# Patient Record
Sex: MALE | Employment: FULL TIME | URBAN - METROPOLITAN AREA
[De-identification: names, ages, dates, MRNs, and addresses within clinical notes are randomized per-mention and may not be internally consistent; named-entity substitution may affect disease eponyms.]

---

## 2019-04-21 ENCOUNTER — APPOINTMENT (OUTPATIENT)
Dept: GENERAL RADIOLOGY | Age: 24
End: 2019-04-21
Attending: PHYSICIAN ASSISTANT
Payer: SELF-PAY

## 2019-04-21 ENCOUNTER — HOSPITAL ENCOUNTER (EMERGENCY)
Age: 24
Discharge: HOME | End: 2019-04-21
Attending: EMERGENCY MEDICINE | Admitting: EMERGENCY MEDICINE
Payer: SELF-PAY

## 2019-04-21 ENCOUNTER — APPOINTMENT (OUTPATIENT)
Dept: CT IMAGING | Age: 24
End: 2019-04-21
Attending: PHYSICIAN ASSISTANT
Payer: SELF-PAY

## 2019-04-21 VITALS
OXYGEN SATURATION: 99 % | SYSTOLIC BLOOD PRESSURE: 133 MMHG | WEIGHT: 165 LBS | TEMPERATURE: 98.7 F | DIASTOLIC BLOOD PRESSURE: 72 MMHG | RESPIRATION RATE: 16 BRPM | HEART RATE: 81 BPM | BODY MASS INDEX: 23.01 KG/M2

## 2019-04-21 DIAGNOSIS — S20.20XA CONTUSION OF THORACIC WALL, UNSPECIFIED AREA OF THORACIC WALL, INITIAL ENCOUNTER: Primary | ICD-10-CM

## 2019-04-21 PROCEDURE — 72070 X-RAY EXAM THORAC SPINE 2VWS: CPT

## 2019-04-21 PROCEDURE — 99283 EMERGENCY DEPT VISIT LOW MDM: CPT

## 2019-04-21 PROCEDURE — 74011250636 HC RX REV CODE- 250/636: Performed by: EMERGENCY MEDICINE

## 2019-04-21 PROCEDURE — 74011250637 HC RX REV CODE- 250/637: Performed by: PHYSICIAN ASSISTANT

## 2019-04-21 PROCEDURE — 72100 X-RAY EXAM L-S SPINE 2/3 VWS: CPT

## 2019-04-21 PROCEDURE — 96374 THER/PROPH/DIAG INJ IV PUSH: CPT

## 2019-04-21 PROCEDURE — 72125 CT NECK SPINE W/O DYE: CPT

## 2019-04-21 RX ORDER — NAPROXEN 500 MG/1
500 TABLET ORAL 2 TIMES DAILY WITH MEALS
Qty: 20 TAB | Refills: 0 | Status: SHIPPED | OUTPATIENT
Start: 2019-04-21 | End: 2020-07-06

## 2019-04-21 RX ORDER — CYCLOBENZAPRINE HCL 5 MG
10 TABLET ORAL
Qty: 9 TAB | Refills: 0 | Status: SHIPPED | OUTPATIENT
Start: 2019-04-21

## 2019-04-21 RX ORDER — MORPHINE SULFATE 2 MG/ML
2 INJECTION, SOLUTION INTRAMUSCULAR; INTRAVENOUS
Status: COMPLETED | OUTPATIENT
Start: 2019-04-21 | End: 2019-04-21

## 2019-04-21 RX ORDER — CYCLOBENZAPRINE HCL 10 MG
10 TABLET ORAL
Status: COMPLETED | OUTPATIENT
Start: 2019-04-21 | End: 2019-04-21

## 2019-04-21 RX ADMIN — MORPHINE SULFATE 2 MG: 2 INJECTION, SOLUTION INTRAMUSCULAR; INTRAVENOUS at 05:58

## 2019-04-21 RX ADMIN — CYCLOBENZAPRINE 10 MG: 10 TABLET, FILM COATED ORAL at 03:51
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
## 2019-04-21 NOTE — ED NOTES
I have reviewed discharge instructions with the patient. The patient verbalized understanding. Patient armband removed and shredded. Pt d/c'd to home awake, alert and in NAD. All questions answered. Pt driven home by his father who is present

## 2020-06-20 ENCOUNTER — APPOINTMENT (OUTPATIENT)
Dept: GENERAL RADIOLOGY | Age: 25
End: 2020-06-20
Attending: EMERGENCY MEDICINE
Payer: COMMERCIAL

## 2020-06-20 ENCOUNTER — HOSPITAL ENCOUNTER (EMERGENCY)
Age: 25
Discharge: HOME | End: 2020-06-20
Attending: EMERGENCY MEDICINE
Payer: COMMERCIAL

## 2020-06-20 VITALS
DIASTOLIC BLOOD PRESSURE: 70 MMHG | SYSTOLIC BLOOD PRESSURE: 126 MMHG | HEIGHT: 70 IN | TEMPERATURE: 98.3 F | HEART RATE: 62 BPM | RESPIRATION RATE: 16 BRPM | WEIGHT: 185 LBS | BODY MASS INDEX: 26.48 KG/M2 | OXYGEN SATURATION: 99 %

## 2020-06-20 DIAGNOSIS — M79.671 RIGHT FOOT PAIN: ICD-10-CM

## 2020-06-20 DIAGNOSIS — S99.921A RIGHT FOOT INJURY, INITIAL ENCOUNTER: Primary | ICD-10-CM

## 2020-06-20 PROCEDURE — 73610 X-RAY EXAM OF ANKLE: CPT

## 2020-06-20 PROCEDURE — 74011250637 HC RX REV CODE- 250/637: Performed by: PHYSICIAN ASSISTANT

## 2020-06-20 PROCEDURE — 73630 X-RAY EXAM OF FOOT: CPT

## 2020-06-20 PROCEDURE — 99283 EMERGENCY DEPT VISIT LOW MDM: CPT

## 2020-06-20 RX ORDER — HYDROCODONE BITARTRATE AND ACETAMINOPHEN 5; 325 MG/1; MG/1
1 TABLET ORAL
Status: COMPLETED | OUTPATIENT
Start: 2020-06-20 | End: 2020-06-20

## 2020-06-20 RX ORDER — HYDROCODONE BITARTRATE AND ACETAMINOPHEN 5; 325 MG/1; MG/1
1 TABLET ORAL
Qty: 12 TAB | Refills: 0 | Status: SHIPPED | OUTPATIENT
Start: 2020-06-20 | End: 2020-06-23

## 2020-06-20 RX ADMIN — HYDROCODONE BITARTRATE AND ACETAMINOPHEN 1 TABLET: 5; 325 TABLET ORAL at 10:19

## 2020-06-20 NOTE — ED PROVIDER NOTES
EMERGENCY DEPARTMENT HISTORY AND PHYSICAL EXAM    Date: 6/20/2020  Patient Name: Funmi Castellon    History of Presenting Illness     Chief Complaint   Patient presents with    Foot Pain    Ankle Pain    Toe Pain         History Provided By: Patient    Chief Complaint: Right foot injury  Duration: Yesterday  Timing: Acute  Location: Midfoot  Quality: Swollen  Severity: Moderate to severe  Modifying Factors: Worse when walking  Associated Symptoms: none       Additional History (Context): Funmi Castellon is a 31 y.o. male with a history of bipolar and hypertension who presents today for history as listed above. Patient reports he kicked the end of his trailer hitch yesterday. Has not tried thing for this at home. Denies any prior injuries or surgeries. Reports pain is worse when bearing weight. PCP: None    Current Facility-Administered Medications   Medication Dose Route Frequency Provider Last Rate Last Dose    HYDROcodone-acetaminophen (NORCO) 5-325 mg per tablet 1 Tab  1 Tab Oral NOW Ivana Cowden E, PA         Current Outpatient Medications   Medication Sig Dispense Refill    HYDROcodone-acetaminophen (Norco) 5-325 mg per tablet Take 1 Tab by mouth every six (6) hours as needed for Pain for up to 3 days. Max Daily Amount: 4 Tabs. 12 Tab 0    cyclobenzaprine (FLEXERIL) 5 mg tablet Take 2 Tabs by mouth three (3) times daily (with meals). 9 Tab 0    naproxen (NAPROSYN) 500 mg tablet Take 1 Tab by mouth two (2) times daily (with meals). 20 Tab 0    methocarbamol (ROBAXIN-750) 750 mg tablet Take 1 Tab by mouth four (4) times daily. 20 Tab 0    ibuprofen (MOTRIN) 600 mg tablet Take 1 Tab by mouth every eight (8) hours as needed for Pain.  20 Tab 0       Past History     Past Medical History:  Past Medical History:   Diagnosis Date    Bipolar 1 disorder ([facility] [address]     Hypertension        Past Surgical History:  Past Surgical History:   Procedure Laterality Date    HX SHOULDER ARTHROSCOPY Family History:  Family History   Problem Relation Age of Onset    Depression Father     Substance Abuse Father     Hypertension Father     Stroke Father     Asthma Sister     Asthma Brother        Social History:  Social History     Tobacco Use    Smoking status: Current Some Day Smoker    Smokeless tobacco: Never Used   Substance Use Topics    Alcohol use: No    Drug use: Yes     Types: Marijuana       Allergies:  No Known Allergies      Review of Systems   Review of Systems   Constitutional: Negative for chills and fever. HENT: Negative for congestion, rhinorrhea and sore throat. Respiratory: Negative for cough and shortness of breath. Cardiovascular: Negative for chest pain. Gastrointestinal: Negative for abdominal pain, blood in stool, constipation, diarrhea, nausea and vomiting. Genitourinary: Negative for dysuria, frequency and hematuria. Musculoskeletal: Positive for arthralgias. Negative for back pain and myalgias. Skin: Negative for rash and wound. Neurological: Negative for dizziness and headaches. All other systems reviewed and are negative. All Other Systems Negative  Physical Exam     Vitals:    06/20/20 0724   BP: 126/70   Pulse: 62   Resp: 16   Temp: 98.3 °F (36.8 °C)   SpO2: 99%   Weight: 83.9 kg (185 lb)   Height: 5' 10\" (1.778 m)     Physical Exam  Vitals signs and nursing note reviewed. Constitutional:       General: He is not in acute distress. Appearance: He is well-developed. He is not diaphoretic. HENT:      Head: Normocephalic and atraumatic. Eyes:      Conjunctiva/sclera: Conjunctivae normal.   Neck:      Musculoskeletal: Normal range of motion and neck supple. Cardiovascular:      Rate and Rhythm: Normal rate and regular rhythm. Heart sounds: Normal heart sounds. Pulmonary:      Effort: Pulmonary effort is normal. No respiratory distress. Breath sounds: Normal breath sounds. Chest:      Chest wall: No tenderness.    Abdominal: General: Bowel sounds are normal. There is no distension. Palpations: Abdomen is soft. Tenderness: There is no abdominal tenderness. There is no guarding or rebound. Musculoskeletal: Normal range of motion. General: No deformity. Feet:    Skin:     General: Skin is warm and dry. Neurological:      Mental Status: He is alert and oriented to person, place, and time. Diagnostic Study Results     Labs -   No results found for this or any previous visit (from the past 12 hour(s)). Radiologic Studies -   XR FOOT RT MIN 3 V   Final Result   IMPRESSION:      Very mild anterior soft tissue swelling as described above. Likely no acute fracture but with findings involving the distal navicular. Please correlate for point tenderness            XR ANKLE RT MIN 3 V   Final Result   IMPRESSION:      Very mild anterior soft tissue swelling as described above. Likely no acute fracture but with findings involving the distal navicular. Please correlate for point tenderness        CT Results  (Last 48 hours)    None        CXR Results  (Last 48 hours)    None            Medical Decision Making   I am the first provider for this patient. I reviewed the vital signs, available nursing notes, past medical history, past surgical history, family history and social history. Vital Signs-Reviewed the patient's vital signs. Records Reviewed: Nursing Notes and Old Medical Records     Procedures: None   Procedures    Provider Notes (Medical Decision Making):     Differential: fracture, dislocation, abrasion, sprain, contusion, laceration      Plan: Will order xrays and pain medication    10:09 AM  Have discussed mild concern for fracture of the right foot. Will place in postop shoe and crutches. Will discharge home with pain medicine. Have stressed the importance of Ortho follow-up. Have discussed rice care for home. Will discharge home.       MED RECONCILIATION:  Current Facility-Administered Medications   Medication Dose Route Frequency    HYDROcodone-acetaminophen (NORCO) 5-325 mg per tablet 1 Tab  1 Tab Oral NOW     Current Outpatient Medications   Medication Sig    HYDROcodone-acetaminophen (Norco) 5-325 mg per tablet Take 1 Tab by mouth every six (6) hours as needed for Pain for up to 3 days. Max Daily Amount: 4 Tabs.  cyclobenzaprine (FLEXERIL) 5 mg tablet Take 2 Tabs by mouth three (3) times daily (with meals).  naproxen (NAPROSYN) 500 mg tablet Take 1 Tab by mouth two (2) times daily (with meals).  methocarbamol (ROBAXIN-750) 750 mg tablet Take 1 Tab by mouth four (4) times daily.  ibuprofen (MOTRIN) 600 mg tablet Take 1 Tab by mouth every eight (8) hours as needed for Pain. Disposition:  Home     DISCHARGE NOTE:   Pt has been reexamined. Patient has no new complaints, changes, or physical findings. Care plan outlined and precautions discussed. Results of workup were reviewed with the patient. All medications were reviewed with the patient. All of pt's questions and concerns were addressed. Patient was instructed and agrees to follow up with ortho as well as to return to the ED upon further deterioration. Patient is ready to go home. Follow-up Information     Follow up With Specialties Details Why Contact Info    [facility] EMERGENCY DEPT Emergency Medicine  As needed [phone] [facility] - Domitila Patel Orthopedic Surgery Schedule an appointment as soon as possible for a visit  [address] [facility], [address] [facility]  [phone]          Current Discharge Medication List      START taking these medications    Details   HYDROcodone-acetaminophen (Norco) 5-325 mg per tablet Take 1 Tab by mouth every six (6) hours as needed for Pain for up to 3 days. Max Daily Amount: 4 Tabs.   Qty: 12 Tab, Refills: 0    Associated Diagnoses: Right foot injury, initial encounter; Right foot pain                 Diagnosis     Clinical Impression:   1. Right foot injury, initial encounter    2. Right foot pain          \"Please note that this dictation was completed with [employer], the computer voice recognition software. Quite often unanticipated grammatical, syntax, homophones, and other interpretive errors are inadvertently transcribed by the computer software. Please disregard these errors. Please excuse any errors that have escaped final proofreading. \"

## 2020-06-20 NOTE — DISCHARGE INSTRUCTIONS
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
## 2020-06-20 NOTE — LETTER
[address] [place] [address] Dr Smitha Leyva Anderson EMERGENCY DEPT 
[address] [facility] [address] 
[phone] Work/School Note Date: 6/20/2020 To Whom It May concern: 
 
Akash Collins was seen and treated today in the emergency room by the following provider(s): 
Attending Provider: Russell Brar MD 
Physician Assistant: GALINA Hernández. Akash Collins may return to work on 6/25/20 Sincerely, GALINA Velasco

## 2020-06-22 ENCOUNTER — OFFICE VISIT (OUTPATIENT)
Dept: ORTHOPEDIC SURGERY | Age: 25
End: 2020-06-22

## 2020-06-22 VITALS
BODY MASS INDEX: 26.48 KG/M2 | TEMPERATURE: 98 F | DIASTOLIC BLOOD PRESSURE: 72 MMHG | RESPIRATION RATE: 15 BRPM | OXYGEN SATURATION: 100 % | HEART RATE: 79 BPM | HEIGHT: 70 IN | WEIGHT: 185 LBS | SYSTOLIC BLOOD PRESSURE: 123 MMHG

## 2020-06-22 DIAGNOSIS — S92.254A CLOSED NONDISPLACED FRACTURE OF NAVICULAR BONE OF RIGHT FOOT, INITIAL ENCOUNTER: ICD-10-CM

## 2020-06-22 DIAGNOSIS — S90.31XA CONTUSION OF RIGHT FOOT, INITIAL ENCOUNTER: Primary | ICD-10-CM

## 2020-06-22 DIAGNOSIS — S84.90XA NEUROPRAXIA OF LEG: ICD-10-CM

## 2020-06-22 RX ORDER — TRAMADOL HYDROCHLORIDE AND ACETAMINOPHEN 37.5; 325 MG/1; MG/1
1-2 TABLET ORAL 2 TIMES DAILY
Qty: 20 TAB | Refills: 0 | Status: SHIPPED | OUTPATIENT
Start: 2020-06-22 | End: 2020-06-27

## 2020-06-22 NOTE — LETTER
NOTIFICATION RETURN TO WORK / SCHOOL 
 
6/22/2020 2:05 PM 
 
Mr. Sea Kc 
[address] [facility] [place] [address] To Whom It May Concern: 
 
Sea Kc is currently under the care of [address] [place] [address] Antoni Soares. Please allow Mr. Shirlene Henderson to remain out of work for the next 3 weeks. He was evaluated for contusion of the right foot and will be reassessed in two weeks. If there are questions or concerns please have the patient contact our office.  
 
 
 
Sincerely, 
 
 
Yao Cooley MD
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
## 2020-07-06 ENCOUNTER — OFFICE VISIT (OUTPATIENT)
Dept: ORTHOPEDIC SURGERY | Age: 25
End: 2020-07-06

## 2020-07-06 VITALS
SYSTOLIC BLOOD PRESSURE: 128 MMHG | HEIGHT: 70 IN | HEART RATE: 71 BPM | RESPIRATION RATE: 16 BRPM | DIASTOLIC BLOOD PRESSURE: 59 MMHG | TEMPERATURE: 98.6 F | WEIGHT: 164.8 LBS | OXYGEN SATURATION: 97 % | BODY MASS INDEX: 23.59 KG/M2

## 2020-07-06 DIAGNOSIS — S90.31XA CONTUSION OF RIGHT FOOT, INITIAL ENCOUNTER: ICD-10-CM

## 2020-07-06 DIAGNOSIS — M79.671 RIGHT FOOT PAIN: ICD-10-CM

## 2020-07-06 DIAGNOSIS — S92.254A CLOSED NONDISPLACED FRACTURE OF NAVICULAR BONE OF RIGHT FOOT, INITIAL ENCOUNTER: Primary | ICD-10-CM

## 2020-07-06 RX ORDER — DICLOFENAC SODIUM 75 MG/1
75 TABLET, DELAYED RELEASE ORAL 2 TIMES DAILY WITH MEALS
Qty: 60 TAB | Refills: 0 | Status: SHIPPED | OUTPATIENT
Start: 2020-07-06

## 2020-07-06 NOTE — LETTER
NOTIFICATION RETURN TO WORK / SCHOOL 
 
7/6/2020 9:15 AM 
 
Mr. Nela Lebron 
[address] [place] [id number] To Whom It May Concern: 
 
Nela Lebron is currently under the care of [address] [place] [address] Antoni Soares. Please allow Mr. Olman Olmstead to remain out of work for another 2 weeks. He is still being evaluated for right foot contusion. If there are questions or concerns please have the patient contact our office.  
 
 
 
Sincerely, 
 
 
Adrienne Coughlin MD

## 2020-07-06 NOTE — PROGRESS NOTES
AMBULATORY PROGRESS NOTE      Patient: Sara Hernandez             MRN: [id number]     SSN: [id number] Body mass index is 23.65 kg/m². YOB: 1995     AGE: 31 y.o. EX: male    PCP: None       IMPRESSION //  DIAGNOSIS AND TREATMENT PLAN      DIAGNOSES  1. Closed nondisplaced fracture of navicular bone of right foot, initial encounter    2. Right foot pain    3. Contusion of right foot, initial encounter        Orders Placed This Encounter    [[id number]] Foot Min 3V     Order Specific Question:   Weight bearing? Answer:   No    diclofenac EC (VOLTAREN) 75 mg EC tablet     Sig: Take 1 Tab by mouth two (2) times daily (with meals). Dispense:  60 Tab     Refill:  0       The neuropraxia, we decide to wait it out with time this shall improve but may take several months. Additional plans are listed as below. History of some discomfort the dorsal part of his right foot, overlying tarsal navicular. His x-rays today, show 3 views, right foot, AP, lateral, and oblique films: Nonweightbearing: Avulsion small avulsion fracture of the dorsal part of the tarsal navicular. Otherwise no subluxation, and/or dislocation is noted. Overall alignment, is anatomic alignment to the foot, midfoot, hindfoot. Right foot contusion, dorsal tarsal navicular avulsion fracture, DPN nerve neuropraxia, improving    PLAN:    1. Voltaren 75 m PO BID; 60 tablets, 0 refills. 2. Work Note: Please allow Mr. Laura Mcclelland to remain out of work for another 2 weeks. He is still being evaluated for right foot contusion. RTO- 2 weeks      HPI //  OBJECTIVE EXAMINATION     Sara Hernandez IS A 31 y.o. male who presents to my outpatient office for RTO evaluation of right foot contusion and  an avulsion fracture of the tarsal navicular.   At the last OV, I instructed the pt to continue to wear hard sole shoes and NWB as much as possible, provided a work note to allow Mr. Laura Mcclelland to remain out of work for the next three weeks, and prescribed Tramadol 1-2 tabs BID 20 tablets. Date of Injury: 2 weeks aggo    Since the last OV, Srinivas Gardiner reports that he is still experiencing persistent pain, but not all the time. He feels pain when he puts pressure or walks for prolonged periods. The pt reports not being able to walk on uneven terrain, citing that his ankle is not strong. He reports that walking on uneven terrain causes him to fall. He admits that he finds himself limping occasionally. The pt admits to having stomach acid problems. 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 ANKLE/FOOT right    Psychiatry: Alert, oriented x 3; speech normal in context and clarity, memory intact grossly, no involuntary movements - tremors, no dementia  Gait: uses assistive device  crutches  Tenderness: moderate to anterior ankle and midfoot, anterior forefoot    Moderate to extensor brevis  Cutaneous: mild swelling to midfoot   Joint Motion: WNL  Joint / Tendon Stability: No Ankle or Subtalar instability or joint laxity. No peroneal sublux ability or dislocation  Alignment: neutral Hindfoot  Neuro Motor/Sensory: NL//positive Tinel sign to the deep peroneal nerve on the dorsal part of the foot. There is numbness in the first webspace, as well as the dorsal part of his foot. Numbness to the dorsal forefoot. Vascular: NL foot/ankle pulses. Lymphatics: No extremity lymphedema, No calf swelling, no tenderness to calf muscles. CHART REVIEW     There is no problem list on file for this patient. Srinivas Gardiner has been experiencing pain and discomfort confirmed as outlined in the pain assessment outlined below.       Pain Assessment  7/6/2020   Location of Pain Foot   Location Modifiers Right   Severity of Pain 6   Quality of Pain Aching   Quality of Pain Comment -   Duration of Pain A few hours   Frequency of Pain Several times daily Aggravating Factors Walking;Standing   Aggravating Factors Comment -   Relieving Factors Rest   Result of Injury No   Type of Injury -   Type of Injury Comment -        Celestine Crane  has a past medical history of Bipolar 1 disorder ([facility] and Hypertension. Patients is employed at:         Past Medical History:   Diagnosis Date    Bipolar 1 disorder ([facility]     Hypertension      Current Outpatient Medications   Medication Sig    diclofenac EC (VOLTAREN) 75 mg EC tablet Take 1 Tab by mouth two (2) times daily (with meals).  cyclobenzaprine (FLEXERIL) 5 mg tablet Take 2 Tabs by mouth three (3) times daily (with meals).  methocarbamol (ROBAXIN-750) 750 mg tablet Take 1 Tab by mouth four (4) times daily.  ibuprofen (MOTRIN) 600 mg tablet Take 1 Tab by mouth every eight (8) hours as needed for Pain. No current facility-administered medications for this visit. THE  FOR Jeffrey Albert MD 7/6/2020 .    No Known Allergies  Past Surgical History:   Procedure Laterality Date    HX SHOULDER ARTHROSCOPY       Social History     Occupational History    Not on file   Tobacco Use    Smoking status: Current Some Day Smoker    Smokeless tobacco: Never Used   Substance and Sexual Activity    Alcohol use: No    Drug use: Yes     Types: Marijuana    Sexual activity: Not on file     Family History   Problem Relation Age of Onset    Depression Father     Substance Abuse Father     Hypertension Father     Stroke Father     Asthma Sister     Asthma Brother         DIAGNOSTIC IMAGING  LAB DATA      No results found for: HBA1C, HGBE8, VSA2WWIW, DIS0KMOS //   Lab Results   Component Value Date/Time    Glucose 94 02/04/2019 01:05 PM        No results found for: TWY9HSLH, TKT3BFVJ      No results found for: VITD3, XQVID2, XQVID3, XQVID, VD3RIA, [id number]      REVIEW OF SYSTEMS : 7/6/2020  ALL BELOW ARE Negative except : SEE HPI     Constitutional: Negative for fever, chills and weight loss. Neg Weight Loss  Cardiovascular: Negative for chest pain, claudication and leg swelling. SOB, WALLS   Gastrointestinal/Urological: Negative for pain, N/V/D/C, Blood in stool or urine,dysuria, Hematuria, Incontinence  Musculoskeletal: see HPI. Neurological: Negative for dizziness and weakness, headaches,Visual Changes or             Confusion, or Seizures,   Psychiatric/Behavioral: Negative for depression, memory loss and substance abuse. Extremities:  Negative for hair changes, rash or skin lesion changes. Hematologic: Negative for Bleeding problems, bruising, pallor or swollen lymph nodes. Peripheral Vascular: No calf pain, vascular vein tenderness to calf pain// No calf throbbing, posterior knee throbbing pain     DIAGNOSTIC IMAGING        XR Results (most recent):  Results from Hospital Encounter encounter on 06/20/20   XR ANKLE RT MIN 3 V    Narrative EXAM: ANKLE THREE VIEWS  RIGHT     CLINICAL HISTORY/INDICATION:  Right ankle foot and toe pain status post injury  one day ago    COMPARISON: None. TECHNIQUE: 3 views    FINDINGS:      There is minimal soft tissue swelling along the anterior ankle and hindfoot. There there is some lucency extending incompletely along the anterior distal  navicular at the site of the overlying soft tissue swelling. The borders are  however ill-defined. The joint spaces are maintained. Mineralization is normal.   There is no radiopaque foreign body. Impression IMPRESSION:    Very mild anterior soft tissue swelling as described above. Likely no acute fracture but with findings involving the distal navicular.   Please correlate for point tenderness        Result Information     Status: Final result (Exam End: 6/20/2020 07:39) Provider Status: Open   Study Result     EXAM: FOOT THREE VIEWS RIGHT      CLINICAL HISTORY/INDICATION: Right ankle, foot, and to pain status post injury  one day ago     COMPARISON: None.     TECHNIQUE: Three views obtained.     FINDINGS:       There is minimal soft tissue swelling along the anterior ankle and talus,  navicular, and cuneiforms. An ill-defined linear region of lucency appears to  interrupt the cortex of the distal anterior navicular but does not extend to the  cortical surface at the navicular cuneiform joint. The borders are ill defined. The joint spaces are maintained. Mineralization is normal.  There is no  radiopaque foreign body.     IMPRESSION  IMPRESSION:     Very mild anterior soft tissue swelling as described above. Likely no acute fracture but with findings involving the distal navicular. Please correlate for point tenderness                  Please see above section of this report. I have personally reviewed the results of the above study. The interpretation of this study is my professional opinion.       Ena Nicholson MD  7/6/2020  12:38 PM

## 2020-07-20 ENCOUNTER — OFFICE VISIT (OUTPATIENT)
Dept: ORTHOPEDIC SURGERY | Age: 25
End: 2020-07-20

## 2020-07-20 VITALS
SYSTOLIC BLOOD PRESSURE: 116 MMHG | WEIGHT: 159.2 LBS | BODY MASS INDEX: 22.29 KG/M2 | TEMPERATURE: 97.3 F | HEART RATE: 93 BPM | DIASTOLIC BLOOD PRESSURE: 72 MMHG | OXYGEN SATURATION: 98 % | HEIGHT: 71 IN | RESPIRATION RATE: 18 BRPM

## 2020-07-20 DIAGNOSIS — S92.254D CLOSED NONDISPLACED FRACTURE OF NAVICULAR BONE OF RIGHT FOOT WITH ROUTINE HEALING, SUBSEQUENT ENCOUNTER: Primary | ICD-10-CM

## 2020-07-20 DIAGNOSIS — M79.671 RIGHT FOOT PAIN: ICD-10-CM

## 2020-07-20 NOTE — PROGRESS NOTES
AMBULATORY PROGRESS NOTE      Patient: Leena Larsen             MRN: [id number]     SSN: [id number] Body mass index is 22.2 kg/m². YOB: 1995     AGE: 31 y.o. EX: male    PCP: None       IMPRESSION //  DIAGNOSIS AND TREATMENT PLAN      DIAGNOSES  1. Right foot pain        Orders Placed This Encounter    [42874] Foot Min 3V     Order Specific Question:   Weight bearing? Answer:   No        Right foot contusion, dorsal tarsal navicular avulsion fracture, DPN nerve neuropraxia, improving    PLAN:    1. Work Note: Please allow Mr. Evangelina Lee to return to work tomorrow for [employer]. RTO-   PRN as he is doing better. HPI //  [address] A 31 y.o. male who presents to my outpatient office for RTO evaluation of right foot contusion and  an avulsion fracture of the tarsal navicular. At the last OV, I prescribed Voltaren 75 mg and provided a work note to allow Mr. Evangelina Lee to remain out of work for another 2 weeks. Date of Injury: end of June approx    Since the last OV, Leena Larsen is no longer having immediate start-up pain. He is still experiencing some occasional soreness. He was not able to obtain the Voltaren, because he was not     The pt admits to having stomach acid problems. 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 ANKLE/FOOT right    Psychiatry: Alert, oriented x 3; speech normal in context and clarity, memory intact grossly, no involuntary movements - tremors, no dementia  Gait: uses assistive device  crutches  Tenderness: mild to anterior ankle and midfoot, anterior forefoot    slight to extensor brevis  Cutaneous: slightly reddened in color. Mild midfoot swelling   Joint Motion: WNL  Joint / Tendon Stability: No Ankle or Subtalar instability or joint laxity.                        No peroneal sublux ability or dislocation  Alignment: neutral Hindfoot  Neuro Motor/Sensory: NL//positive Tinel sign to the deep peroneal nerve on the dorsal part of the foot. There is numbness in the first webspace, as well as the dorsal part of his foot. Numbness to the dorsal forefoot. Vascular: NL foot/ankle pulses. Lymphatics: No extremity lymphedema, No calf swelling, no tenderness to calf muscles. CHART REVIEW     There is no problem list on file for this patient. Ira Mathews has been experiencing pain and discomfort confirmed as outlined in the pain assessment outlined below. Pain Assessment  7/6/2020   Location of Pain Foot   Location Modifiers Right   Severity of Pain 6   Quality of Pain Aching   Quality of Pain Comment -   Duration of Pain A few hours   Frequency of Pain Several times daily   Aggravating Factors Walking;Standing   Aggravating Factors Comment -   Relieving Factors Rest   Result of Injury No   Type of Injury -   Type of Injury Comment -        Ira Mathews  has a past medical history of Bipolar 1 disorder ([facility]) and Hypertension. Patients is employed at:         Past Medical History:   Diagnosis Date    Bipolar 1 disorder ([address]     Hypertension      Current Outpatient Medications   Medication Sig    diclofenac EC (VOLTAREN) 75 mg EC tablet Take 1 Tab by mouth two (2) times daily (with meals).  cyclobenzaprine (FLEXERIL) 5 mg tablet Take 2 Tabs by mouth three (3) times daily (with meals).  methocarbamol (ROBAXIN-750) 750 mg tablet Take 1 Tab by mouth four (4) times daily.  ibuprofen (MOTRIN) 600 mg tablet Take 1 Tab by mouth every eight (8) hours as needed for Pain. No current facility-administered medications for this visit. THE  FOR [facility]  WAS REVIEWED BY Perez Leach 7/20/2020 .    No Known Allergies  Past Surgical History:   Procedure Laterality Date    HX SHOULDER ARTHROSCOPY       Social History     Occupational History    Not on file   Tobacco Use    Smoking status: Current Some Day Smoker    Smokeless tobacco: Never Used   Substance and Sexual Activity    Alcohol use: No    Drug use: Yes     Types: Marijuana    Sexual activity: Not on file     Family History   Problem Relation Age of Onset    Depression Father     Substance Abuse Father     Hypertension Father     Stroke Father     Asthma Sister     Asthma Brother         DIAGNOSTIC IMAGING  LAB DATA      No results found for: HBA1C, HGBE8, VCN0ZGRA, PIC1GNRW //   Lab Results   Component Value Date/Time    Glucose 94 02/04/2019 01:05 PM        No results found for: YNW9FNUR, LPF5BNVH      No results found for: VITD3, XQVID2, XQVID3, XQVID, VD3RIA, UHNV60CXQCV      REVIEW OF SYSTEMS : 7/20/2020  ALL BELOW ARE Negative except : SEE HPI     Constitutional: Negative for fever, chills and weight loss. Neg Weight Loss  Cardiovascular: Negative for chest pain, claudication and leg swelling. SOB, WALLS   Gastrointestinal/Urological: Negative for pain, N/V/D/C, Blood in stool or urine,dysuria, Hematuria, Incontinence  Musculoskeletal: see HPI. Neurological: Negative for dizziness and weakness, headaches,Visual Changes or             Confusion, or Seizures,   Psychiatric/Behavioral: Negative for depression, memory loss and substance abuse. Extremities:  Negative for hair changes, rash or skin lesion changes. Hematologic: Negative for Bleeding problems, bruising, pallor or swollen lymph nodes. Peripheral Vascular: No calf pain, vascular vein tenderness to calf pain// No calf throbbing, posterior knee throbbing pain     DIAGNOSTIC IMAGING        XR Results (most recent):  Results from Hospital Encounter encounter on 06/20/20   XR ANKLE RT MIN 3 V    Narrative EXAM: ANKLE THREE VIEWS  RIGHT     CLINICAL HISTORY/INDICATION:  Right ankle foot and toe pain status post injury  one day ago    COMPARISON: None. TECHNIQUE: 3 views    FINDINGS:      There is minimal soft tissue swelling along the anterior ankle and hindfoot.   There there is some lucency extending incompletely along the anterior distal  navicular at the site of the overlying soft tissue swelling. The borders are  however ill-defined. The joint spaces are maintained. Mineralization is normal.   There is no radiopaque foreign body. Impression IMPRESSION:    Very mild anterior soft tissue swelling as described above. Likely no acute fracture but with findings involving the distal navicular. Please correlate for point tenderness        Result Information     Status: Final result (Exam End: 6/20/2020 07:39) Provider Status: Open   Study Result     EXAM: FOOT THREE VIEWS RIGHT      CLINICAL HISTORY/INDICATION: Right ankle, foot, and to pain status post injury  one day ago     COMPARISON: None.     TECHNIQUE: Three views obtained.     FINDINGS:       There is minimal soft tissue swelling along the anterior ankle and talus,  navicular, and cuneiforms. An ill-defined linear region of lucency appears to  interrupt the cortex of the distal anterior navicular but does not extend to the  cortical surface at the navicular cuneiform joint. The borders are ill defined. The joint spaces are maintained. Mineralization is normal.  There is no  radiopaque foreign body.     IMPRESSION  IMPRESSION:     Very mild anterior soft tissue swelling as described above. Likely no acute fracture but with findings involving the distal navicular. Please correlate for point tenderness           X RAYS/IMAGES DONE AT [facility] VIEW OFFICE 7/20/2020     FOOT X RAYS 3 VIEWS Right   7/20/2020    NON WEIGHT BEARING    X RAYS AT [address] [place] [address]  7/20/2020      Bones: No fractures or dislocations. No focal osteolytic or osteoblastic process     Bone Spurs: No significant bone spurs //I can barely see the fracture overlying the tarsal navicular. Still avulsion type fracture.   Foot Alignment: WNL  Joint Condition: No Significant OA  Soft Tissues: Normal, No radiopaque foreign body and No abnormal calcific densities to soft tissues   No ankle joint effusion in lateral projection. Mineralization: Suggests  no Osteopenia    I have personally reviewed the results of the above study and the interpretation of this study is my professional opinion        Please see above section of this report. I have personally reviewed the results of the above study. The interpretation of this study is my professional opinion.       Sanjuana Turner  7/20/2020  12:38 PM

## 2020-07-20 NOTE — LETTER
NOTIFICATION RETURN TO WORK / SCHOOL 
 
7/20/2020 9:59 AM 
 
Mr. Maribell Hubbard 
[address] [address] To Whom It May Concern: 
 
Maribell Hubbard is currently under the care of [address] [place], [place] [address]jesus [facility]. Please allow Mr. Aniket Tran to return to work tomorrow for [employer]. If there are questions or concerns please have the patient contact our office.  
 
 
 
Sincerely, 
 
 
Sima Gautam MD

## 2022-09-22 ENCOUNTER — NEW PATIENT (OUTPATIENT)
Dept: RURAL CLINIC 2 | Facility: CLINIC | Age: 27
End: 2022-09-22

## 2022-09-22 DIAGNOSIS — H53.59: ICD-10-CM

## 2022-09-22 DIAGNOSIS — H52.223: ICD-10-CM

## 2022-09-22 DIAGNOSIS — H52.12: ICD-10-CM

## 2022-09-22 PROCEDURE — 92015 DETERMINE REFRACTIVE STATE: CPT

## 2022-09-22 PROCEDURE — 99203 OFFICE O/P NEW LOW 30 MIN: CPT

## 2022-09-22 ASSESSMENT — TONOMETRY
OS_IOP_MMHG: 17
OD_IOP_MMHG: 19

## 2022-09-22 ASSESSMENT — VISUAL ACUITY
OD_SC: 20/20
OS_SC: 20/20

## 2023-07-19 ENCOUNTER — OFFICE VISIT (OUTPATIENT)
Age: 28
End: 2023-07-19
Payer: COMMERCIAL

## 2023-07-19 VITALS
HEART RATE: 71 BPM | TEMPERATURE: 97.9 F | HEIGHT: 69 IN | BODY MASS INDEX: 26.36 KG/M2 | RESPIRATION RATE: 16 BRPM | OXYGEN SATURATION: 98 % | DIASTOLIC BLOOD PRESSURE: 80 MMHG | WEIGHT: 178 LBS | SYSTOLIC BLOOD PRESSURE: 122 MMHG

## 2023-07-19 DIAGNOSIS — M54.12 CERVICAL NEURITIS: ICD-10-CM

## 2023-07-19 DIAGNOSIS — M54.2 CERVICAL PAIN (NECK): ICD-10-CM

## 2023-07-19 DIAGNOSIS — S39.012A STRAIN OF LUMBAR REGION, INITIAL ENCOUNTER: ICD-10-CM

## 2023-07-19 DIAGNOSIS — M50.30 DDD (DEGENERATIVE DISC DISEASE), CERVICAL: ICD-10-CM

## 2023-07-19 DIAGNOSIS — M54.16 LUMBAR NEURITIS: Primary | ICD-10-CM

## 2023-07-19 DIAGNOSIS — S16.1XXA STRAIN OF NECK MUSCLE, INITIAL ENCOUNTER: ICD-10-CM

## 2023-07-19 DIAGNOSIS — M54.50 LUMBAR PAIN: ICD-10-CM

## 2023-07-19 PROCEDURE — 99204 OFFICE O/P NEW MOD 45 MIN: CPT | Performed by: PHYSICAL MEDICINE & REHABILITATION

## 2023-07-19 RX ORDER — METHOCARBAMOL 500 MG/1
500 TABLET, FILM COATED ORAL 4 TIMES DAILY
COMMUNITY
Start: 2023-07-18

## 2023-07-19 RX ORDER — MELOXICAM 7.5 MG/1
7.5 TABLET ORAL DAILY
COMMUNITY
Start: 2023-07-18

## 2023-07-19 RX ORDER — METHYLPREDNISOLONE 4 MG/1
TABLET ORAL
Qty: 1 KIT | Refills: 0 | Status: SHIPPED | OUTPATIENT
Start: 2023-07-19
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
## 2023-07-26 ENCOUNTER — TELEPHONE (OUTPATIENT)
Age: 28
End: 2023-07-26

## 2023-07-26 NOTE — TELEPHONE ENCOUNTER
Attempted to contact pt to inform that I have spoken to [facility] and they have received his referral .He did not answer. LVM. [facility] at [phone] for scheduling.

## 2023-07-26 NOTE — TELEPHONE ENCOUNTER
Patient came into office stating he has not received a call from PT to get scheduled. Please call [phone].

## 2023-08-15 ENCOUNTER — TELEPHONE (OUTPATIENT)
Age: 28
End: 2023-08-15

## 2023-08-15 NOTE — TELEPHONE ENCOUNTER
Patient called upset due to he had a VV appt with JOSEFA Maldonado to have the Disability from filled out today. Patient said that he has been calling several times to make sure his appt was okay for today , because he needs the form filled out, so that he can return it to work as soon as possible. Patient said that he even got a call today and was told the everything was okay, and then they ended up cancelling his appt ,due to he lives in [place]. Patient said that the only reason he seen our doctors here is because their are no specialist in [place]. Patient said that we have had his forms for 1 1/2 months. That he is not being paid, and he needs to pay his electric bill. That today was the last day for him to pay the bill or they will cut off his electricity. Patient is requesting a call back at  [facility]. [phone].

## 2023-08-15 NOTE — TELEPHONE ENCOUNTER
Bhumi,     I cannot change the law regarding these VV. The Covid emergency is over so you have to be licensed in the state the pt is being seen at. This was a [facility] disability form that Dr. Coral Salmeron would have to sign.

## 2023-08-18 NOTE — TELEPHONE ENCOUNTER
Called patient [phone] and verified his name and date if birth. I informed him that I was following up on his concerns in regards to the virtual visit. Patient stated yes he had called 3 days before his appointment and he was told that he could have the appointment. Then he called 90 minutes before his appointment and then he stated he was told that he could still have the appointment and then 20 minutes before his appointment he received a call and was told that he could not have the appointment due to where he lives. He stated that was some bull crap because we have had the form for a while and he does not know why we cannot just sign the form because he needs it. He stated he feels like he is getting the run around. 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 He stated things are getting ready to be turned off because he does not have any money to pay because we do not want to sign this paper. He stated he only came to [facility] because we were the only ones that took his insurance. I explained to him due to the [facility] restrictions being lifted that we can no longer do the VV outside of the [facility]. I informed him that if he wanted too. He could have his PCP fill the paperwork out. If he feels that. He stated so why can't the doctor just fill the paper out and sign. I informed him that we generally have our NP fill the forms out through a visit and then the doctor signs it. He inquired if I could ask the doctor to fill it out and sign. So, he could get pain. I informed him that the doctor was out of the office and will be in the office on Monday and I will send the message to him and see what his response is. He stated if he can;t do it then he may have to come to office and see for himself. I informed him that I will send the message to the provider and once we receive a response. Someone will call him.  He stated

## 2023-08-21 NOTE — TELEPHONE ENCOUNTER
I discussed this with Dr Emely Aguayo this morning and he gave me permission to fill out the form. It was only asking for his out of work dates. He signed it and it has been scanned into the patients chart. I tried calling the patient to let him know that he can pick it up, and his VM is not set up.

## 2023-08-30 ENCOUNTER — OFFICE VISIT (OUTPATIENT)
Age: 28
End: 2023-08-30
Payer: COMMERCIAL

## 2023-08-30 VITALS
RESPIRATION RATE: 17 BRPM | WEIGHT: 185 LBS | HEART RATE: 72 BPM | OXYGEN SATURATION: 100 % | TEMPERATURE: 97.1 F | BODY MASS INDEX: 27.32 KG/M2

## 2023-08-30 DIAGNOSIS — M50.30 DDD (DEGENERATIVE DISC DISEASE), CERVICAL: ICD-10-CM

## 2023-08-30 DIAGNOSIS — M54.12 CERVICAL NEURITIS: ICD-10-CM

## 2023-08-30 DIAGNOSIS — M54.16 LUMBAR NEURITIS: Primary | ICD-10-CM

## 2023-08-30 DIAGNOSIS — M54.50 LUMBAR PAIN: ICD-10-CM

## 2023-08-30 DIAGNOSIS — M54.2 CERVICAL PAIN (NECK): ICD-10-CM

## 2023-08-30 PROCEDURE — 99213 OFFICE O/P EST LOW 20 MIN: CPT | Performed by: PHYSICAL MEDICINE & REHABILITATION
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
## 2023-09-07 ENCOUNTER — TELEPHONE (OUTPATIENT)
Age: 28
End: 2023-09-07

## 2023-09-07 NOTE — TELEPHONE ENCOUNTER
LVM for the patient to take OTC Motrin or Tylenol Arthritis until his next Dr. Adela Dubin appointment.

## 2023-09-11 ENCOUNTER — HOSPITAL ENCOUNTER (OUTPATIENT)
Facility: HOSPITAL | Age: 28
Discharge: HOME | End: 2023-09-14
Attending: PHYSICAL MEDICINE & REHABILITATION
Payer: COMMERCIAL

## 2023-09-11 DIAGNOSIS — M54.50 LUMBAR PAIN: ICD-10-CM

## 2023-09-11 DIAGNOSIS — M50.30 DDD (DEGENERATIVE DISC DISEASE), CERVICAL: ICD-10-CM

## 2023-09-11 DIAGNOSIS — Z01.89 ENCOUNTER FOR IMAGING TO SCREEN FOR EYE METAL PRIOR TO MAGNETIC RESONANCE IMAGING (MRI): ICD-10-CM

## 2023-09-11 DIAGNOSIS — M54.12 CERVICAL NEURITIS: ICD-10-CM

## 2023-09-11 DIAGNOSIS — M54.2 CERVICAL PAIN (NECK): ICD-10-CM

## 2023-09-11 DIAGNOSIS — M54.16 LUMBAR NEURITIS: ICD-10-CM

## 2023-09-11 PROCEDURE — 72141 MRI NECK SPINE W/O DYE: CPT

## 2023-09-11 PROCEDURE — 70030 X-RAY EYE FOR FOREIGN BODY: CPT

## 2023-09-11 PROCEDURE — 72148 MRI LUMBAR SPINE W/O DYE: CPT

## 2023-10-09 ENCOUNTER — OFFICE VISIT (OUTPATIENT)
Age: 28
End: 2023-10-09
Payer: COMMERCIAL

## 2023-10-09 VITALS
OXYGEN SATURATION: 98 % | TEMPERATURE: 97.3 F | WEIGHT: 182 LBS | BODY MASS INDEX: 26.88 KG/M2 | HEART RATE: 71 BPM | RESPIRATION RATE: 18 BRPM

## 2023-10-09 DIAGNOSIS — M54.16 LUMBAR NEURITIS: Primary | ICD-10-CM

## 2023-10-09 DIAGNOSIS — M54.2 CERVICAL PAIN (NECK): ICD-10-CM

## 2023-10-09 DIAGNOSIS — M54.50 LUMBAR PAIN: ICD-10-CM

## 2023-10-09 DIAGNOSIS — M50.30 DDD (DEGENERATIVE DISC DISEASE), CERVICAL: ICD-10-CM

## 2023-10-09 DIAGNOSIS — M54.12 CERVICAL NEURITIS: ICD-10-CM

## 2023-10-09 PROCEDURE — 99214 OFFICE O/P EST MOD 30 MIN: CPT | Performed by: PHYSICAL MEDICINE & REHABILITATION
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
